# Patient Record
Sex: FEMALE | Employment: FULL TIME | URBAN - NONMETROPOLITAN AREA
[De-identification: names, ages, dates, MRNs, and addresses within clinical notes are randomized per-mention and may not be internally consistent; named-entity substitution may affect disease eponyms.]

---

## 2018-02-12 ENCOUNTER — HOSPITAL ENCOUNTER (OUTPATIENT)
Dept: MRI IMAGING | Facility: OTHER | Age: 28
Discharge: HOME | End: 2018-02-12
Attending: FAMILY MEDICINE | Admitting: FAMILY MEDICINE
Payer: COMMERCIAL

## 2018-02-12 VITALS — BODY MASS INDEX: 20.67 KG/M2 | WEIGHT: 130 LBS

## 2018-02-12 DIAGNOSIS — H93.19 TINNITUS: ICD-10-CM

## 2018-02-12 DIAGNOSIS — H90.5 SENSORINEURAL HEARING LOSS: ICD-10-CM

## 2018-02-12 DIAGNOSIS — R20.2 PARESTHESIA OF LEFT UPPER AND LOWER EXTREMITY: ICD-10-CM

## 2018-02-12 DIAGNOSIS — R20.0: ICD-10-CM

## 2018-02-12 PROCEDURE — 25500064 ZZH RX 255 OP 636: Performed by: RADIOLOGY

## 2018-02-12 PROCEDURE — 70553 MRI BRAIN STEM W/O & W/DYE: CPT

## 2018-02-12 PROCEDURE — A9575 INJ GADOTERATE MEGLUMI 0.1ML: HCPCS | Performed by: RADIOLOGY

## 2018-02-12 RX ORDER — GADOTERATE MEGLUMINE 376.9 MG/ML
11 INJECTION INTRAVENOUS ONCE
Status: DISCONTINUED | OUTPATIENT
Start: 2018-02-12 | End: 2018-02-12 | Stop reason: CLARIF

## 2018-02-12 RX ORDER — GADOTERATE MEGLUMINE 376.9 MG/ML
15 INJECTION INTRAVENOUS ONCE
Status: DISCONTINUED | OUTPATIENT
Start: 2018-02-12 | End: 2018-02-12 | Stop reason: CLARIF

## 2018-02-12 RX ORDER — GADOTERATE MEGLUMINE 376.9 MG/ML
0.2 INJECTION INTRAVENOUS ONCE
Status: COMPLETED | OUTPATIENT
Start: 2018-02-12 | End: 2018-02-12

## 2018-02-12 RX ADMIN — GADOTERATE MEGLUMINE 12 ML: 376.9 INJECTION INTRAVENOUS at 16:30

## 2018-03-07 ENCOUNTER — DOCUMENTATION ONLY (OUTPATIENT)
Dept: FAMILY MEDICINE | Facility: OTHER | Age: 28
End: 2018-03-07

## 2019-05-21 ENCOUNTER — TRANSFERRED RECORDS (OUTPATIENT)
Dept: HEALTH INFORMATION MANAGEMENT | Facility: CLINIC | Age: 29
End: 2019-05-21

## 2019-05-23 NOTE — TELEPHONE ENCOUNTER
RECORDS RECEIVED FROM: Rt knee injury, appt per pt. Records at [facility]. No previous surgery, Imaging at [facility]   DATE RECEIVED: Blair 3, 2019    NOTES STATUS DETAILS   OFFICE NOTE from referring provider Received [facility] 5/21/19   OFFICE NOTE from other specialist N/A    DISCHARGE SUMMARY from hospital N/A    DISCHARGE REPORT from the ER N/A    OPERATIVE REPORT N/A    MEDICATION LIST Received    IMPLANT RECORD/STICKER N/A    LABS     CBC/DIFF N/A    CULTURES N/A    INJECTIONS DONE IN RADIOLOGY N/A    MRI Received 5/28/19   CT SCAN N/A    XRAYS (IMAGES & REPORTS) received 5/21/19   TUMOR     PATHOLOGY  Slides & report N/A      05/23/19   5:00 PM  Faxed request to [facility]    05/28/19   11:55 AM  [facility] refuses to send records. Requires sina.  4:12 PM attempted to reach pt for SINA    05/30/19   3:35 PM  After speaking with patient, emailed her an SINA to sign    06/03/19   9:00 AM  Received SINA from patient, faxed request and sina to [facility].

## 2019-05-28 ENCOUNTER — TRANSFERRED RECORDS (OUTPATIENT)
Dept: HEALTH INFORMATION MANAGEMENT | Facility: CLINIC | Age: 29
End: 2019-05-28

## 2019-06-03 ENCOUNTER — OFFICE VISIT (OUTPATIENT)
Dept: ORTHOPEDICS | Facility: CLINIC | Age: 29
End: 2019-06-03
Payer: COMMERCIAL

## 2019-06-03 ENCOUNTER — PRE VISIT (OUTPATIENT)
Dept: ORTHOPEDICS | Facility: CLINIC | Age: 29
End: 2019-06-03

## 2019-06-03 VITALS — WEIGHT: 135 LBS | BODY MASS INDEX: 21.69 KG/M2 | HEIGHT: 66 IN

## 2019-06-03 DIAGNOSIS — G89.29 CHRONIC PAIN OF RIGHT KNEE: Primary | ICD-10-CM

## 2019-06-03 DIAGNOSIS — M25.561 CHRONIC PAIN OF RIGHT KNEE: Primary | ICD-10-CM

## 2019-06-03 ASSESSMENT — ENCOUNTER SYMPTOMS
BLOATING: 0
SINUS CONGESTION: 0
VOMITING: 0
HOARSE VOICE: 0
TASTE DISTURBANCE: 0
SORE THROAT: 0
NAUSEA: 1
RECTAL PAIN: 0
DIARRHEA: 0
HEARTBURN: 0
BLOOD IN STOOL: 0
ABDOMINAL PAIN: 0
BOWEL INCONTINENCE: 0
NECK MASS: 0
CONSTIPATION: 0
SINUS PAIN: 0
SMELL DISTURBANCE: 0
TROUBLE SWALLOWING: 0
JAUNDICE: 0

## 2019-06-03 ASSESSMENT — MIFFLIN-ST. JEOR: SCORE: 1369.11

## 2019-06-03 NOTE — LETTER
6/3/2019       RE: Erika Christianson  [address] [place] [address] [address]  [place] [address]     Dear Colleague,    Thank you for referring your patient, Erika Christianson, to [facility]. Please see a copy of my visit note below.    CHIEF CONCERN: Right ACL tear    HISTORY:   28-year-old female was picking up a Frisbee.  Felt a pop.  Unable to continue to play.  Saw outside provider who obtain an MRI.  Showed grade 3 rupture of her anterior cruciate ligament.  Referred to my clinic for definitive management.    PAST MEDICAL HISTORY: (Reviewed with the patient and in the [facility] medical record)  1. None    PAST SURGICAL HISTORY: (Reviewed with the patient and in the [facility] medical record)  1. None    MEDICATIONS: (Reviewed with the patient and in the [facility] medical record)    Notable medications include: None    ALLERGIES: (Reviewed with the patient and in the [facility] medical record)  1. Per epic it does include doxycycline sulfa and amoxicillin clavulanate      SOCIAL HISTORY: (Reviewed with the patient and in the medical record)  --Tobacco: None  --Occupation: She works as a nurse at the [employer]  --Avocation/Sport: Enjoys reading books as well as playing [employer], she admits she does not really play sports    FAMILY HISTORY: (Reviewed with the patient and in the medical record)  -- No family history of bleeding, clotting, or difficulty with anesthesia        REVIEW OF SYSTEMS: (Reviewed with the patient and on the health intake form)  -- A comprehensive 10 point review of systems was conducted and is negative except as noted in the HPI    EXAM:     General: Awake, Alert and Oriented, No acute Distress. Articulate and Interactive    Body mass index is 21.79 kg/m .    Right lower extremity :    Skin is Warm and Well perfused, no suggestion of infection    No incisions    1+ effusion    Positive knee tenderness no definite medial lateral joint line tenderness    Range of motion  is 0 to 110 degrees    2B Lachman, 1+ pivot shift, stable to varus valgus stress testing stable posterior drawer testing    EHL/FHL/TA/GS 5/5    Sensation intact L3-S1    2+ Dorsalis Pedis Pulse         IMAGING:    Plain Radiographs: Plain radiographs show no fractures or dislocations    MRI: Weight 3 rupture of the ACL, intact menisci    ASSESSMENT:  1. ACL tear right knee, intact menisci    PLAN:  1. I offered ACL reconstruction, bone tendon bone autograft    I discussed with her the risks benefits comp occasions techniques and alternatives to surgery we reviewed the expected course of recovery and alternative treatment options together through a combined decision making approach we elected to proceed.    I will look for her at the time of surgery.  Sooner if there are problems along the way.  She will get a preoperative history and physical.  I recognize that she has a history of Lyme's disease and that she may have a flare from the symptoms however I told her that the reason not to do surgery.      Again, thank you for allowing me to participate in the care of your patient.      Sincerely,    Arias Sanchez MD

## 2019-06-03 NOTE — PROGRESS NOTES
CHIEF CONCERN: Right ACL tear    HISTORY:   28-year-old female was picking up a Frisbee.  Felt a pop.  Unable to continue to play.  Saw outside provider who obtain an MRI.  Showed grade 3 rupture of her anterior cruciate ligament.  Referred to my clinic for definitive management.    PAST MEDICAL HISTORY: (Reviewed with the patient and in the [facility] medical record)  1. None    PAST SURGICAL HISTORY: (Reviewed with the patient and in the [facility] medical record)  1. None    MEDICATIONS: (Reviewed with the patient and in the [facility] medical record)    Notable medications include: None    ALLERGIES: (Reviewed with the patient and in the [facility] medical record)  1. Per epic it does include doxycycline sulfa and amoxicillin clavulanate      SOCIAL HISTORY: (Reviewed with the patient and in the medical record)  --Tobacco: None  --Occupation: She works as a nurse at the [employer]  --Avocation/Sport: Enjoys reading books as well as playing Frisbee, she admits she does not really play sports    FAMILY HISTORY: (Reviewed with the patient and in the medical record)  -- No family history of bleeding, clotting, or difficulty with anesthesia        REVIEW OF SYSTEMS: (Reviewed with the patient and on the health intake form)  -- A comprehensive 10 point review of systems was conducted and is negative except as noted in the HPI    EXAM:     General: Awake, Alert and Oriented, No acute Distress. Articulate and Interactive    Body mass index is 21.79 kg/m .    Right lower extremity :    Skin is Warm and Well perfused, no suggestion of infection    No incisions    1+ effusion    Positive knee tenderness no definite medial lateral joint line tenderness    Range of motion is 0 to 110 degrees    2B Lachman, 1+ pivot shift, stable to varus valgus stress testing stable posterior drawer testing    EHL/FHL/TA/GS 5/5    Sensation intact L3-S1    2+ Dorsalis Pedis Pulse         IMAGING:    Plain Radiographs: Plain radiographs show no fractures or  dislocations    MRI: Weight 3 rupture of the ACL, intact menisci    ASSESSMENT:  1. ACL tear right knee, intact menisci    PLAN:  1. I offered ACL reconstruction, bone tendon bone autograft    I discussed with her the risks benefits comp occasions techniques and alternatives to surgery we reviewed the expected course of recovery and alternative treatment options together through a combined decision making approach we elected to proceed.    I will look for her at the time of surgery.  Sooner if there are problems along the way.  She will get a preoperative history and physical.  I recognize that she has a history of Lyme's disease and that she may have a flare from the symptoms however I told her that the reason not to do surgery.

## 2019-06-04 ENCOUNTER — DOCUMENTATION ONLY (OUTPATIENT)
Dept: ORTHOPEDICS | Facility: CLINIC | Age: 29
End: 2019-06-04

## 2019-06-04 NOTE — PROGRESS NOTES
Patient is scheduled for surgery with Dr. Sanchez    Spoke or left message with: Patient in exam room    Date of Surgery: 6/28/19    [facility]    Post op: 1 week, scheduled     Pre-op with surgeon (if applicable): Complete    H&P: Patient to schedule    Additional imaging/appointments: N/A    Surgery packet: Received in clinic     Additional comments: N/A

## 2019-06-04 NOTE — NURSING NOTE
Teaching Flowsheet   Relevant Diagnosis: Right ACL tear  Teaching Topic: Right ACL reconstruction.    Patient's health history is negative on review. 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     Person(s) involved in teaching:   Patient     Motivation Level:  Asks Questions: Yes  Eager to Learn: Yes  Cooperative: Yes  Receptive (willing/able to accept information): Yes  Any cultural factors/Rastafarian beliefs that may influence understanding or compliance? No     Patient demonstrates understanding of the following:  Reason for the appointment, diagnosis and treatment plan: Yes  Knowledge of proper use of medications and conditions for which they are ordered (with special attention to potential side effects or drug interactions): Yes  Which situations necessitate calling provider and whom to contact: Yes     Teaching Concerns Addressed: Patient understands she will need a preoperative H&P within 30 days of the date of surgery. PT should begin 3-5 days postop.     Proper use and care of brace, crutches (medical equip, care aids, etc.): Yes  Nutritional needs and diet plan: Yes  Pain management techniques: Yes  Wound Care: Yes  How and/when to access community resources: Yes     Instructional Materials Used/Given: Preoperative teaching packet, surgical soap.

## 2019-06-27 ENCOUNTER — ANESTHESIA EVENT (OUTPATIENT)
Dept: SURGERY | Facility: AMBULATORY SURGERY CENTER | Age: 29
End: 2019-06-27

## 2019-06-28 ENCOUNTER — HOSPITAL ENCOUNTER (OUTPATIENT)
Facility: AMBULATORY SURGERY CENTER | Age: 29
End: 2019-06-28
Attending: ORTHOPAEDIC SURGERY
Payer: COMMERCIAL

## 2019-06-28 ENCOUNTER — ANESTHESIA (OUTPATIENT)
Dept: SURGERY | Facility: AMBULATORY SURGERY CENTER | Age: 29
End: 2019-06-28

## 2019-06-28 VITALS
SYSTOLIC BLOOD PRESSURE: 108 MMHG | WEIGHT: 135 LBS | HEART RATE: 53 BPM | DIASTOLIC BLOOD PRESSURE: 81 MMHG | OXYGEN SATURATION: 100 % | BODY MASS INDEX: 21.69 KG/M2 | RESPIRATION RATE: 16 BRPM | HEIGHT: 66 IN | TEMPERATURE: 98 F

## 2019-06-28 DIAGNOSIS — Z98.890 STATUS POST KNEE SURGERY: Primary | ICD-10-CM

## 2019-06-28 LAB
HCG UR QL: NEGATIVE
INTERNAL QC OK POCT: YES

## 2019-06-28 DEVICE — IMP SCR ARTHREX CAN FULL THRD 08X25MM AR-1381T: Type: IMPLANTABLE DEVICE | Site: KNEE | Status: FUNCTIONAL

## 2019-06-28 DEVICE — IMP SCR ARTHREX CAN 07X20MM AR-1370E: Type: IMPLANTABLE DEVICE | Site: KNEE | Status: FUNCTIONAL

## 2019-06-28 RX ORDER — KETOROLAC TROMETHAMINE 30 MG/ML
30 INJECTION, SOLUTION INTRAMUSCULAR; INTRAVENOUS
Status: COMPLETED | OUTPATIENT
Start: 2019-06-28 | End: 2019-06-28

## 2019-06-28 RX ORDER — BUPIVACAINE HYDROCHLORIDE AND EPINEPHRINE 2.5; 5 MG/ML; UG/ML
INJECTION, SOLUTION INFILTRATION; PERINEURAL PRN
Status: DISCONTINUED | OUTPATIENT
Start: 2019-06-28 | End: 2019-06-28

## 2019-06-28 RX ORDER — DEXAMETHASONE SODIUM PHOSPHATE 4 MG/ML
INJECTION, SOLUTION INTRA-ARTICULAR; INTRALESIONAL; INTRAMUSCULAR; INTRAVENOUS; SOFT TISSUE PRN
Status: DISCONTINUED | OUTPATIENT
Start: 2019-06-28 | End: 2019-06-28

## 2019-06-28 RX ORDER — ONDANSETRON 4 MG/1
4 TABLET, ORALLY DISINTEGRATING ORAL EVERY 30 MIN PRN
Status: DISCONTINUED | OUTPATIENT
Start: 2019-06-28 | End: 2019-06-29 | Stop reason: HOSPADM

## 2019-06-28 RX ORDER — BUPIVACAINE HYDROCHLORIDE AND EPINEPHRINE 2.5; 5 MG/ML; UG/ML
INJECTION, SOLUTION INFILTRATION; PERINEURAL PRN
Status: DISCONTINUED | OUTPATIENT
Start: 2019-06-28 | End: 2019-06-28 | Stop reason: HOSPADM

## 2019-06-28 RX ORDER — ONDANSETRON 4 MG/1
4-8 TABLET, ORALLY DISINTEGRATING ORAL EVERY 8 HOURS PRN
Qty: 4 TABLET | Refills: 0 | Status: SHIPPED | OUTPATIENT
Start: 2019-06-28

## 2019-06-28 RX ORDER — PROPOFOL 10 MG/ML
INJECTION, EMULSION INTRAVENOUS PRN
Status: DISCONTINUED | OUTPATIENT
Start: 2019-06-28 | End: 2019-06-28

## 2019-06-28 RX ORDER — LIDOCAINE HYDROCHLORIDE 20 MG/ML
INJECTION, SOLUTION INFILTRATION; PERINEURAL PRN
Status: DISCONTINUED | OUTPATIENT
Start: 2019-06-28 | End: 2019-06-28

## 2019-06-28 RX ORDER — PROPOFOL 10 MG/ML
INJECTION, EMULSION INTRAVENOUS CONTINUOUS PRN
Status: DISCONTINUED | OUTPATIENT
Start: 2019-06-28 | End: 2019-06-28

## 2019-06-28 RX ORDER — OXYCODONE HYDROCHLORIDE 5 MG/1
5 TABLET ORAL EVERY 4 HOURS PRN
Status: DISCONTINUED | OUTPATIENT
Start: 2019-06-28 | End: 2019-06-29 | Stop reason: HOSPADM

## 2019-06-28 RX ORDER — SODIUM CHLORIDE, SODIUM LACTATE, POTASSIUM CHLORIDE, CALCIUM CHLORIDE 600; 310; 30; 20 MG/100ML; MG/100ML; MG/100ML; MG/100ML
INJECTION, SOLUTION INTRAVENOUS CONTINUOUS
Status: DISCONTINUED | OUTPATIENT
Start: 2019-06-28 | End: 2019-06-28 | Stop reason: HOSPADM

## 2019-06-28 RX ORDER — AMOXICILLIN 250 MG
1-2 CAPSULE ORAL 2 TIMES DAILY
Qty: 24 TABLET | Refills: 0 | Status: SHIPPED | OUTPATIENT
Start: 2019-06-28

## 2019-06-28 RX ORDER — FLUMAZENIL 0.1 MG/ML
0.2 INJECTION, SOLUTION INTRAVENOUS
Status: DISCONTINUED | OUTPATIENT
Start: 2019-06-28 | End: 2019-06-28 | Stop reason: HOSPADM

## 2019-06-28 RX ORDER — GABAPENTIN 300 MG/1
300 CAPSULE ORAL ONCE
Status: COMPLETED | OUTPATIENT
Start: 2019-06-28 | End: 2019-06-28

## 2019-06-28 RX ORDER — HYDROMORPHONE HYDROCHLORIDE 1 MG/ML
.3-.5 INJECTION, SOLUTION INTRAMUSCULAR; INTRAVENOUS; SUBCUTANEOUS EVERY 10 MIN PRN
Status: DISCONTINUED | OUTPATIENT
Start: 2019-06-28 | End: 2019-06-29 | Stop reason: HOSPADM

## 2019-06-28 RX ORDER — OXYCODONE HYDROCHLORIDE 5 MG/1
5-10 TABLET ORAL EVERY 4 HOURS PRN
Qty: 30 TABLET | Refills: 0 | Status: SHIPPED | OUTPATIENT
Start: 2019-06-28

## 2019-06-28 RX ORDER — FENTANYL CITRATE 50 UG/ML
25-50 INJECTION, SOLUTION INTRAMUSCULAR; INTRAVENOUS EVERY 5 MIN PRN
Status: DISCONTINUED | OUTPATIENT
Start: 2019-06-28 | End: 2019-06-28 | Stop reason: HOSPADM

## 2019-06-28 RX ORDER — NALOXONE HYDROCHLORIDE 0.4 MG/ML
.1-.4 INJECTION, SOLUTION INTRAMUSCULAR; INTRAVENOUS; SUBCUTANEOUS
Status: DISCONTINUED | OUTPATIENT
Start: 2019-06-28 | End: 2019-06-28 | Stop reason: HOSPADM

## 2019-06-28 RX ORDER — SODIUM CHLORIDE, SODIUM LACTATE, POTASSIUM CHLORIDE, CALCIUM CHLORIDE 600; 310; 30; 20 MG/100ML; MG/100ML; MG/100ML; MG/100ML
INJECTION, SOLUTION INTRAVENOUS CONTINUOUS
Status: DISCONTINUED | OUTPATIENT
Start: 2019-06-28 | End: 2019-06-29 | Stop reason: HOSPADM

## 2019-06-28 RX ORDER — FENTANYL CITRATE 50 UG/ML
25-50 INJECTION, SOLUTION INTRAMUSCULAR; INTRAVENOUS
Status: DISCONTINUED | OUTPATIENT
Start: 2019-06-28 | End: 2019-06-28 | Stop reason: HOSPADM

## 2019-06-28 RX ORDER — ONDANSETRON 2 MG/ML
4 INJECTION INTRAMUSCULAR; INTRAVENOUS EVERY 30 MIN PRN
Status: DISCONTINUED | OUTPATIENT
Start: 2019-06-28 | End: 2019-06-29 | Stop reason: HOSPADM

## 2019-06-28 RX ORDER — ACETAMINOPHEN 325 MG/1
650 TABLET ORAL EVERY 4 HOURS
Qty: 120 TABLET | Refills: 0 | Status: SHIPPED | OUTPATIENT
Start: 2019-06-28

## 2019-06-28 RX ORDER — CLINDAMYCIN PHOSPHATE 900 MG/50ML
900 INJECTION, SOLUTION INTRAVENOUS
Status: DISCONTINUED | OUTPATIENT
Start: 2019-06-28 | End: 2019-06-28 | Stop reason: HOSPADM

## 2019-06-28 RX ORDER — ACETAMINOPHEN 325 MG/1
975 TABLET ORAL ONCE
Status: COMPLETED | OUTPATIENT
Start: 2019-06-28 | End: 2019-06-28

## 2019-06-28 RX ORDER — NALOXONE HYDROCHLORIDE 0.4 MG/ML
.1-.4 INJECTION, SOLUTION INTRAMUSCULAR; INTRAVENOUS; SUBCUTANEOUS
Status: DISCONTINUED | OUTPATIENT
Start: 2019-06-28 | End: 2019-06-29 | Stop reason: HOSPADM

## 2019-06-28 RX ORDER — CLINDAMYCIN PHOSPHATE 900 MG/50ML
900 INJECTION, SOLUTION INTRAVENOUS SEE ADMIN INSTRUCTIONS
Status: DISCONTINUED | OUTPATIENT
Start: 2019-06-28 | End: 2019-06-28 | Stop reason: HOSPADM

## 2019-06-28 RX ADMIN — FENTANYL CITRATE 50 MCG: 50 INJECTION, SOLUTION INTRAMUSCULAR; INTRAVENOUS at 10:38

## 2019-06-28 RX ADMIN — ACETAMINOPHEN 975 MG: 325 TABLET ORAL at 09:36

## 2019-06-28 RX ADMIN — CLINDAMYCIN PHOSPHATE 900 MG: 900 INJECTION, SOLUTION INTRAVENOUS at 10:36

## 2019-06-28 RX ADMIN — KETOROLAC TROMETHAMINE 30 MG: 30 INJECTION, SOLUTION INTRAMUSCULAR; INTRAVENOUS at 12:46

## 2019-06-28 RX ADMIN — LIDOCAINE HYDROCHLORIDE 60 MG: 20 INJECTION, SOLUTION INFILTRATION; PERINEURAL at 10:28

## 2019-06-28 RX ADMIN — HYDROMORPHONE HYDROCHLORIDE 0.3 MG: 1 INJECTION, SOLUTION INTRAMUSCULAR; INTRAVENOUS; SUBCUTANEOUS at 13:01

## 2019-06-28 RX ADMIN — HYDROMORPHONE HYDROCHLORIDE 0.3 MG: 1 INJECTION, SOLUTION INTRAMUSCULAR; INTRAVENOUS; SUBCUTANEOUS at 12:50

## 2019-06-28 RX ADMIN — GABAPENTIN 300 MG: 300 CAPSULE ORAL at 09:36

## 2019-06-28 RX ADMIN — SODIUM CHLORIDE, SODIUM LACTATE, POTASSIUM CHLORIDE, CALCIUM CHLORIDE: 600; 310; 30; 20 INJECTION, SOLUTION INTRAVENOUS at 09:53

## 2019-06-28 RX ADMIN — FENTANYL CITRATE 50 MCG: 50 INJECTION, SOLUTION INTRAMUSCULAR; INTRAVENOUS at 12:47

## 2019-06-28 RX ADMIN — BUPIVACAINE HYDROCHLORIDE AND EPINEPHRINE 10 ML: 2.5; 5 INJECTION, SOLUTION INFILTRATION; PERINEURAL at 10:10

## 2019-06-28 RX ADMIN — FENTANYL CITRATE 50 MCG: 50 INJECTION, SOLUTION INTRAMUSCULAR; INTRAVENOUS at 12:37

## 2019-06-28 RX ADMIN — FENTANYL CITRATE 50 MCG: 50 INJECTION, SOLUTION INTRAMUSCULAR; INTRAVENOUS at 10:13

## 2019-06-28 RX ADMIN — PROPOFOL 200 MG: 10 INJECTION, EMULSION INTRAVENOUS at 10:28

## 2019-06-28 RX ADMIN — DEXAMETHASONE SODIUM PHOSPHATE 4 MG: 4 INJECTION, SOLUTION INTRA-ARTICULAR; INTRALESIONAL; INTRAMUSCULAR; INTRAVENOUS; SOFT TISSUE at 10:36

## 2019-06-28 RX ADMIN — PROPOFOL 200 MCG/KG/MIN: 10 INJECTION, EMULSION INTRAVENOUS at 10:28

## 2019-06-28 RX ADMIN — OXYCODONE HYDROCHLORIDE 5 MG: 5 TABLET ORAL at 12:38

## 2019-06-28 RX ADMIN — FENTANYL CITRATE 50 MCG: 50 INJECTION, SOLUTION INTRAMUSCULAR; INTRAVENOUS at 12:04

## 2019-06-28 RX ADMIN — PROPOFOL: 10 INJECTION, EMULSION INTRAVENOUS at 11:25

## 2019-06-28 RX ADMIN — ONDANSETRON 4 MG: 2 INJECTION INTRAMUSCULAR; INTRAVENOUS at 12:05

## 2019-06-28 ASSESSMENT — MIFFLIN-ST. JEOR: SCORE: 1368.86

## 2019-06-28 ASSESSMENT — LIFESTYLE VARIABLES: TOBACCO_USE: 0

## 2019-06-28 NOTE — OP NOTE
PREOPERATIVE DIAGNOSIS:   1. ACL tear right knee    POSTOPERATIVE DIAGNOSIS:  1. ACL tear right knee  2. Intact medial lateral meniscus    PROCEDURE:  1. Examination under anesthesia right knee  2. Right knee arthroscopy  3. ACL reconstruction bone tendon bone autograft    DATE OF SURGERY: 6/28/2019    SURGEON: Arias Sanchez MD    ASSISTANT: None.     RESIDENT OR FELLOW: Caryl Mccallum MD; Jayson Lin MD    OPERATIVE INDICATIONS: Erika Christianson is a pleasant 28 year old female who I saw through my orthopedic clinic with a history, physical, imaging consistent with right ACL tear.  I reviewed with the patient the risks, benefits, complications, techniques and alternatives to surgery.  We reviewed the expected course of recovery and the potential expected outcomes.  The patient understood both the risks and benefits and desired to proceed despite the risks.    OPERATIVE DETAILS: In the preoperative area the patient's informed consent was reviewed and they desired to proceed.  The right leg was marked and the patient was in agreement.  The patient was taken to the operating room where a timeout was performed and all parties were in agreement.  Preoperative antibiotics were given within 1 hour of the time of incision.  The patient was placed in the supine position and surrendered to LMA anesthesia.  No tourniquet was applied.  Egg crate was placed beneath the well leg and a side post was utilized.  The operative leg was prepped and draped in the usual sterile fashion.     Examination under anesthesia: Range of motion 0 to 145 degrees, 1 quadrant medial and 2 quadrant lateral translation of the patella, stable to varus and valgus stress testing, stable posterior drawer testing, 2+ anterior drawer testing, 2B Lachman, 1+ pivot shift    Graft harvest and preparation: A 5 cm midline incision was made and hemostasis was insured with the Bovie electrocautery.  The peritenon was opened longitudinally.  And we selected a  "section of tendon 10 mm in width.  We then marked on the tibial side 10 x 25 mm.  This was marked with a knife, defined with a Bovie and cut with an oscillating saw it was delivered into the wound with an osteotome.  The knee was brought to full extension where a proximal patellar retractor was placed.  We selected a section of bone 10 mm in width by 20 mm in length it was marked with a knife to find with the Bovie and cut with an oscillating saw.  No malleting was performed of the patella.    The graft was taken to the back table where it was fashioned to a 10 on the femur size 10 on the tibia.  2 drill holes were placed in each of the bone blocks and #2 fiber wires were placed through these holes.  A \"safety stitch\" was placed at the bone tendon interface on the tibial side.  Ink marking pen was used to eric the bone tendon interface in the femoral side.  The final graft dimensions showed a 20 mm bone block on the femoral side, a 25 mm bone block on the tibial side and the tibial plus tendon length of 75 mm.    Anterior medial and anterior lateral arthroscopic portals were created and a diagnostic arthroscopy was performed with the following findings: The medial patella facet, lateral patella facet, central ridge of the patella showed normal cartilage.  The trochlear cartilage was normal.  The medial femoral condyle showed normal cartilage and medial tibial plateau showed normal cartilage. The lateral femoral condyle showed mild cartilage and lateral tibial plateau showed normal. The Medial meniscus intact and Lateral Meniscus intact.  There was a grade 3 rupture of the anterior cruciate ligament with positive empty wall sign.  The PCL was intact.  There was no opening to varus and valgus stress testing in either the lateral or medial compartments, respectively.    A debridement of the nonfunctioning ACL was then performed until we could visualize the anatomic insertion sites of the ACL on both the femoral and the " tibial origin.  Remnant preservation was pursued in the tibial side and the tibial tunnel was centered midway between the medial and lateral tibial spines in line with the posterior aspect of the anterior horn of the lateral meniscus.    A tip to tip guide was introduced through the medial portal.  Our osseous length measured 45 mm to accommodate the tibial plus tendon length of our graft.  A 2.4 mm drill to pin was placed into the center of the anatomic insertion of the ACL on the tibial side.  A 10 mm reamer was then placed over this guidepin.  We dilated sequentially from 10-10.5 mm.  A plug was placed on the tibial side.    A spinal needle was then used to localize our accessory medial portal.  Once we are satisfied with its position a Sivakumar awl was used to select our location along the anatomic insertion of the ACL on the femoral footprint.  A Beath pin was placed.  The knee was hyperflexed.  The pin was advanced through the femur and a 10 mm low-profile reamer was used to ream a 25 mm femoral socket.  Bone debris was removed with motorized suction.  A passing suture was placed which was routed through the tibia.  Notching of the femoral tunnel was then performed.    The graft was brought up the patellar donor bone block was reduced through the tibial tunnel and dunked into the femur where it was fixed with a 7 x 20 mm metal interference screw.  Excellent purchase of the screw is noted.  The graft was cycled 20 times, maximal manual traction was applied and an 8 x 25 mm screw was placed in the tibial side with excellent purchase.  Lachman 0, no pivot shift, final arthroscopic images showed clearance along the root of the intercondylar notch and extension, clearance along the lateral wall and PCL in flexion.  Good tension to probing.    Copious irrigation was performed an a layered closure was initiated, sterile dressings were applied and the patient was transferred to the recovery room in stable condition  with stable vital signs.    ESTIMATED BLOOD LOSS: 25 mL.    TOURNIQUET TIME: No tourniquet was placed.    COMPLICATIONS: None apparent.    DRAINS: None.    SPECIMENS: None.     POSTOPERATIVE PLAN:  Weightbearing as tolerated, wean from crutches when able  Knee immobilizer times 1 week then wean when able  Average time to wean from crutches and brace is 2-3 weeks  The goal is to walk into my clinic at 6 weeks with no brace and no crutches  No running until 3 months  No sports until 6 months, return to game competition at 7-10 months  Shower on day 3  Start physical therapy day 3-5

## 2019-06-28 NOTE — ANESTHESIA POSTPROCEDURE EVALUATION
Anesthesia POST Procedure Evaluation    Patient: Erika Christianson   MRN:     [id number] Gender:   female   Age:    28 year old :      1990        Preoperative Diagnosis: Anterior Cruciate Ligament Tear   Procedure(s):  Right Examinaton Under Anesthesia. Anterior Cruciate Ligament Resconstruction Bone Tendon Bone Autograft   Postop Comments: No value filed.       Anesthesia Type:  General, Regional  No value filed.    Reportable Event: NO     PAIN: Uncomplicated   Sign Out status: Comfortable, Well controlled pain     PONV: No PONV   Sign Out status:  No Nausea or Vomiting     Neuro/Psych: Uneventful perioperative course   Sign Out Status: Preoperative baseline; Age appropriate mentation     Airway/Resp.: Uneventful perioperative course   Sign Out Status: Non labored breathing, age appropriate RR; Resp. Status within EXPECTED Parameters     CV: Uneventful perioperative course   Sign Out status: Appropriate BP and perfusion indices; Appropriate HR/Rhythm     Disposition:   Sign Out in:  PACU  Disposition:  Phase II; Home  Recovery Course: Uneventful  Follow-Up: Not required           Last Anesthesia Record Vitals:  CRNA VITALS  2019 1151 - 2019 1251      2019             Pulse:  76    SpO2:  99 %          Last PACU Vitals:  Vitals Value Taken Time   /77 2019  1:00 PM   Temp 36.7  C (98  F) 2019  1:00 PM   Pulse 78 2019  1:00 PM   Resp 23 2019  1:12 PM   SpO2 100 % 2019  1:12 PM   Temp src     NIBP     Pulse     SpO2     Resp     Temp     Ht Rate     Temp 2     Vitals shown include unvalidated device data.      Electronically Signed By: Froylan Orta MD, 2019, 1:40 PM

## 2019-06-28 NOTE — DISCHARGE INSTRUCTIONS
[facility]  Home Care Following Anesthesia  For 24 hours after surgery:  1. Get plenty of rest.  A responsible adult must stay with you for at least 24 hours after you leave the surgery center.  2. Do not drive or use heavy equipment.  If you have weakness or tingling, don't drive or use heavy equipment until this feeling goes away.   3. Do not drink alcohol.   4. Avoid strenuous or risky activities.  Ask for help when climbing stairs.  5. You may feel lightheaded.  IF so, sit for a few minutes before standing.  Have someone help you get up.   6. If you have nausea (feel sick to your stomach): Drink only clear liquids such as apple juice, ginger ale, broth or 7-Up.  Rest may also help.  Be sure to drink enough fluids.  Move to a regular diet as you feel able.   7. You may have a slight fever.  Call the doctor if your fever is over 100 F (37.7 C) (taken under the tongue) or lasts longer than 24 hours.  8. You may have a dry mouth, a sore throat, muscle aches or trouble sleeping. These should go away after 24 hours.  9. Do not make important or legal decisions.            Tips for taking pain medications  To get the best pain relief possible, remember these points:    Take pain medications as directed, before pain becomes severe.    Pain medication can upset your stomach: taking it with food may help.    Constipation is a common side effect of pain medication. Drink plenty of  fluids.    Eat foods high in fiber. Take a stool softener if recommended by your doctor or pharmacist.    Do not drink alcohol, drive or operate machinery while taking pain medications.    Ask about other ways to control pain, such as with heat, ice or relaxation.    Tylenol/Acetaminophen Consumption  To help encourage the safe use of acetaminophen, the makers of TYLENOL  have lowered the maximum daily dose for single-ingredient Extra Strength TYLENOL  (acetaminophen) products sold in the [place] from 8 pills  per day (4,000 mg) to 6 pills per day (3,000 mg). The dosing interval has also changed from 2 pills every 4-6 hours to 2 pills every 6 hours.    If you feel your pain relief is insufficient, you may take Tylenol/Acetaminophen in addition to your narcotic pain medication.     Be careful not to exceed 3,000 mg of Tylenol/Acetaminophen in a 24 hour period from all sources.    If you are taking extra strength Tylenol/acetaminophen (500 mg), the maximum dose is 6 tablets in 24 hours.    If you are taking regular strength acetaminophen (325 mg), the maximum dose is 9 tablets in 24 hours.    Call a doctor for any of the followin. Signs of infection (fever, growing tenderness at the surgery site, a large amount of drainage or bleeding, severe pain, foul-smelling drainage, redness, swelling).  2. It has been over 8 to 10 hours since surgery and you are still not able to urinate (pass water).  3. Headache for over 24 hours.  4. Numbness, tingling or weakness the day after surgery (if you had spinal anesthesia).  Your doctor is:       Dr. Arias Sanchez, Orthopaedics: [phone]               Or dial [phone] and ask for the resident on call for:  Orthopaedics  For emergency care, call the:  [facility] Emergency Department: [phone] (TTY for hearing impaired: [phone])Post Operative Instructions: Regional Anesthetic for Lower Extremity     General Information:   Regional anesthesia is when local anesthetic or  numbing  medication is injected around the nerves to anesthetize or  numb  the area supplied by that set of nerves. It is a type of analgesia used to control pain and decreases the need for narcotics following surgery.    Types of Regional Blocks:  Femoral: A block injected into the groin area of the operative leg of a patient having thigh or knee surgery.  Adductor Canal: A block injected into the mid thigh of the operative leg of a patient having knee or ankle surgery.  Popliteal or Distal Sciatic:  A block injected into the back of the knee of the operative leg of patients having foot or ankle surgery.   Ankle:  An anesthetic medication is injected into the ankle of the operative leg of a patient having foot or toe surgery.     Procedure:   The type of anesthesia your doctor used to numb your leg will usually not wear off for 6-18 hours, but may last as long as 24 hours. You should be careful during that period, since it is possible to injure your leg without being aware of the injury. While your leg is numb you should:    Use crutches (minimal weight bearing until your motor and strength is completely back to normal)    Avoid striking or bumping your leg    Avoid extreme hot or cold    Discomfort:  You will have a tingling and prickly sensation in your leg as the feeling begins to return; you can also expect some discomfort. The amount of discomfort is unpredictable, but if you have more pain than can be controlled with pain medication, you should notify your physician.     Pain Medicine:   Begin taking your oral pain pills (if you have not already done so) before bedtime and during the night to avoid a sudden onset of pain as the block wears off.  Do not engage in drinking, driving, or hazardous occupations while taking pain medication.

## 2019-06-28 NOTE — ANESTHESIA PREPROCEDURE EVALUATION
Anesthesia Pre-Procedure Evaluation    Patient: Erika Christianson   MRN:     [id number] Gender:   female   Age:    28 year old :      1990        Preoperative Diagnosis: Anterior Cruciate Ligament Tear   Procedure(s):  Right Examinaton Under Anesthesia. Anterior Cruciate Ligament Resconstruction Bone Tendon Bone Autograft  Right Meniscus Surgery.     No past medical history on file.   Past Surgical History:   Procedure Laterality Date     ORTHOPEDIC SURGERY      middle left toe surgery          Anesthesia Evaluation     . Pt has had prior anesthetic. Type: General    No history of anesthetic complications          ROS/MED HX    ENT/Pulmonary:      (-) tobacco use   Neurologic: Comment: Lyme disease      Cardiovascular:  - neg cardiovascular ROS       METS/Exercise Tolerance:     Hematologic:  - neg hematologic  ROS       Musculoskeletal: Comment: ACL        GI/Hepatic:  - neg GI/hepatic ROS       Renal/Genitourinary:  - ROS Renal section negative       Endo:  - neg endo ROS       Psychiatric:  - neg psychiatric ROS       Infectious Disease:  - neg infectious disease ROS       Malignancy:      - no malignancy   Other:                         PHYSICAL EXAM:   Mental Status/Neuro: A/A/O   Airway: Facies: Feasible  Mallampati: II  Mouth/Opening: Full  TM distance: > 6 cm  Neck ROM: Full   Respiratory: Auscultation: CTAB     Resp. Rate: Normal     Resp. Effort: Normal      CV: Rhythm: Regular  Rate: Age appropriate  Heart: Normal Sounds   Comments:      Dental: Normal                  Lab Results   Component Value Date    WBC 5.4 2012    HGB 13.2 2012    HCT 39.8 2012     2012     2012    POTASSIUM 3.8 2012    CHLORIDE 104 2012    CO2 28 2012    BUN 10 2012    CR 0.58 2012    GLC 74 2012    ISADORA 9.5 2012    TSH 2.05 2012    HCG Negative 2019    HCGS Negative 2012       Preop Vitals  BP Readings from Last 3  "Encounters:   06/28/19 120/84   11/09/12 114/77   05/03/12 109/66    Pulse Readings from Last 3 Encounters:   11/09/12 69   05/03/12 60   03/13/12 84      Resp Readings from Last 3 Encounters:   06/28/19 14   03/13/12 18    SpO2 Readings from Last 3 Encounters:   06/28/19 98%   11/09/12 99%   05/03/12 100%      Temp Readings from Last 1 Encounters:   06/28/19 36.9  C (98.5  F) (Temporal)    Ht Readings from Last 1 Encounters:   06/28/19 1.676 m (5' 5.98\")      Wt Readings from Last 1 Encounters:   06/28/19 61.2 kg (135 lb)    Estimated body mass index is 21.8 kg/m  as calculated from the following:    Height as of this encounter: 1.676 m (5' 5.98\").    Weight as of this encounter: 61.2 kg (135 lb).     LDA:  Peripheral IV 06/28/19 Left Hand (Active)   Site Assessment WDL 6/28/2019  9:55 AM   Line Status Infusing 6/28/2019  9:55 AM   Phlebitis Scale 0-->no symptoms 6/28/2019  9:55 AM   Dressing Intervention New dressing  6/28/2019  9:55 AM   Number of days: 0       Airway - Adult/Peds laryngeal mask airway (Active)   Number of days: 0       Airway - Adult/Peds 4 laryngeal mask airway (Active)   Number of days: 0            Assessment:   ASA SCORE: 1    NPO Status: > 6 hours since completed [employer]   Documentation: H&P complete; Preop Testing complete; Consents complete   Proceeding: Proceed without further delay  Tobacco Use:  NO Active use of Tobacco/UNKNOWN Tobacco use status     Plan:   Anes. Type:  General; Regional     RA Details:  Pre Induction; SS; Exparel     RA-Location/Type: Nerve Block; Adductor canal   Pre-Induction: Midazolam IV; Opioid IV; Acetaminophen PO   Induction:  IV (Standard)   Airway: LMA   Access/Monitoring: PIV   Maintenance: Balanced; Propofol   Emergence: Procedure Site   Logistics: Same Day Surgery     Postop Pain/Sedation Strategy:  Standard-Options: Opioids PRN; Block SS; Exparel     PONV Management:  Adult Risk Factors: Female, Non-Smoker, Postop Opioids  Prevention: Ondansetron; " Dexamethasone; Propofol Infusion     CONSENT: Direct conversation   Plan and risks discussed with: Patient   Blood Products: Consent Deferred (Minimal Blood Loss)                         Corbin Pang MD

## 2019-06-28 NOTE — ANESTHESIA PROCEDURE NOTES
Peripheral Nerve Block Procedure Note    Staff:     Anesthesiologist:  Froylan Orta MD    Resident/CRNA:  Corbin Pang MD    Block performed by resident/CRNA in the presence of a teaching physician    Location: Pre-op  Procedure Start/Stop TImes:      6/28/2019 10:05 AM     6/28/2019 10:15 AM    patient identified, IV checked, site marked, risks and benefits discussed, informed consent, monitors and equipment checked, pre-op evaluation, at physician/surgeon's request and post-op pain management      Correct Patient: Yes      Correct Position: Yes      Correct Site: Yes      Correct Procedure: Yes      Correct Laterality:  Yes    Site Marked:  Yes  Procedure details:     Procedure:  Adductor canal    ASA:  1    Laterality:  Right    Position:  Supine    Sterile Prep: chloraprep      Needle:  Short bevel and insulated    Needle gauge:  21    Needle length (mm):  110    Ultrasound: Yes      Ultrasound used to identify targeted nerve, plexus, or vascular structure and placed a needle adjacent to it      Permanent Image entered into patiient's record      Abnormal pain on injection: No      Blood Aspirated: No      Paresthesias:  No    Bleeding at site: No      Bolus via:  Needle    Infusion Method:  Single Shot    Complications:  None  Assessment/Narrative:     Injection made incrementally with aspirations every (mL):  5     Exparel 133mg

## 2019-06-28 NOTE — ANESTHESIA CARE TRANSFER NOTE
Patient: Erika Christianson    Procedure(s):  Right Examinaton Under Anesthesia. Anterior Cruciate Ligament Resconstruction Bone Tendon Bone Autograft    Diagnosis: Anterior Cruciate Ligament Tear  Diagnosis Additional Information: No value filed.    Anesthesia Type:   No value filed.     Note:  Airway :Room Air  Patient transferred to:PACU  Comments: VSS/WNL. Responds well.Handoff Report: Identifed the Patient, Identified the Reponsible Provider, Reviewed the pertinent medical history, Discussed the surgical course, Reviewed Intra-OP anesthesia mangement and issues during anesthesia, Set expectations for post-procedure period and Allowed opportunity for questions and acknowledgement of understanding      Vitals: (Last set prior to Anesthesia Care Transfer)    CRNA VITALS  6/28/2019 1151 - 6/28/2019 1227      6/28/2019             Pulse:  76    SpO2:  99 %                Electronically Signed By: LANA Pettit CRNA  June 28, 2019  12:27 PM

## 2019-07-02 DIAGNOSIS — Z98.890 S/P ACL RECONSTRUCTION: Primary | ICD-10-CM

## 2019-07-08 ENCOUNTER — OFFICE VISIT (OUTPATIENT)
Dept: ORTHOPEDICS | Facility: CLINIC | Age: 29
End: 2019-07-08
Payer: COMMERCIAL

## 2019-07-08 ENCOUNTER — TRANSFERRED RECORDS (OUTPATIENT)
Dept: HEALTH INFORMATION MANAGEMENT | Facility: CLINIC | Age: 29
End: 2019-07-08

## 2019-07-08 ENCOUNTER — ANCILLARY PROCEDURE (OUTPATIENT)
Dept: GENERAL RADIOLOGY | Facility: CLINIC | Age: 29
End: 2019-07-08
Attending: ORTHOPAEDIC SURGERY
Payer: COMMERCIAL

## 2019-07-08 DIAGNOSIS — Z98.890 S/P ACL RECONSTRUCTION: ICD-10-CM

## 2019-07-08 DIAGNOSIS — Z98.890 S/P ACL RECONSTRUCTION: Primary | ICD-10-CM

## 2019-07-08 NOTE — LETTER
7/8/2019       RE: Erika Christianson  [address]  [place] [address]     Dear Colleague,    Thank you for referring your patient, Erika Christianson, to the [facility]. Please see a copy of my visit note below.    DIAGNOSIS:   1. Right ACL tear    PROCEDURES:  1. ACL reconstruction bone tendon bone autograft, date of surgery 6/28/2019    HISTORY:  Doing well.  Off narcotics.  Off crutches, out of brace.  Working with therapy.    EXAM:     General: Awake, Alert, and oriented. Articulates and communicates with a normal affect     Right lower Extremity:    Incisions well healed without evidence of infection    Normal post-operative effusion and ecchymosis    Range of motion and stability exam not performed    Neurovascularly intact    IMAGING:  AP and lateral radiographs of tunnels and hardware in good position    ASSESSMENT:  1. 1 week following ACL reconstruction bone tendon bone autograft    PLAN:     Weightbearing as tolerated    Range of motion as tolerated    Sutures removed in clinic    Leave steri-strips in place until they fall off    OK to shower allowing water to run over incision    No soaking, scrubbing, baths, or lake for 1 additional week    Continue PT as scheduled     Pain medications reviewed and no refills required.     Operative report provided and arthroscopic images reviewed    Follow up at 6 weeks from the date of surgery with no new X-Rays needed           Again, thank you for allowing me to participate in the care of your patient.      Sincerely,    Arias Sanchez MD

## 2019-07-08 NOTE — PROGRESS NOTES
DIAGNOSIS:   1. Right ACL tear    PROCEDURES:  1. ACL reconstruction bone tendon bone autograft, date of surgery 6/28/2019    HISTORY:  Doing well.  Off narcotics.  Off crutches, out of brace.  Working with therapy.    EXAM:     General: Awake, Alert, and oriented. Articulates and communicates with a normal affect     Right lower Extremity:    Incisions well healed without evidence of infection    Normal post-operative effusion and ecchymosis    Range of motion and stability exam not performed    Neurovascularly intact    IMAGING:  AP and lateral radiographs of tunnels and hardware in good position    ASSESSMENT:  1. 1 week following ACL reconstruction bone tendon bone autograft    PLAN:     Weightbearing as tolerated    Range of motion as tolerated    Sutures removed in clinic    Leave steri-strips in place until they fall off    OK to shower allowing water to run over incision    No soaking, scrubbing, baths, or lake for 1 additional week    Continue PT as scheduled     Pain medications reviewed and no refills required.     Operative report provided and arthroscopic images reviewed    Follow up at 6 weeks from the date of surgery with no new X-Rays needed

## 2019-08-12 ENCOUNTER — OFFICE VISIT (OUTPATIENT)
Dept: ORTHOPEDICS | Facility: CLINIC | Age: 29
End: 2019-08-12
Payer: COMMERCIAL

## 2019-08-12 DIAGNOSIS — Z98.890 S/P ACL RECONSTRUCTION: Primary | ICD-10-CM

## 2019-08-12 NOTE — LETTER
Return to Work  2019     Seen today: yes    Patient:  Erika Christianson  :   1990  MRN:     [id number]  Physician: KANDI SANCHEZ        To whom it may concern;      Erika Christianson is under my care following her right knee surgery on 2019. She may return to work for 4 hour shifts. As she is able, she may progress up to 8 hour shifts.     Please contact my office with any questions or concerns.        Electronically signed by Kandi Sanchez MD & Erin Carbajal ATC

## 2019-08-12 NOTE — PROGRESS NOTES
DIAGNOSIS:   1. Right ACL tear    PROCEDURES:  1. ACL reconstruction bone tendon bone autograft, date of surgery 6/28/2019    HISTORY:  Doing well 6 weeks following the above surgery.  No pain.  Off narcotics.  Doing therapy.    EXAM:     General: Awake, Alert, and oriented. Articulates and communicates with a normal affect     Right lower Extremity:    Incisions well healed without evidence of infection    No post-operative effusion or ecchymosis    Range of motion and stability exam not performed    Neurovascularly intact    IMAGING:  No new images today  ASSESSMENT:  1. 6 weeks following ACL reconstruction bone tendon bone autograft    PLAN:   Weightbearing: WBAT  Range of Motion: No range of motion restrictions  Pain Medications: We reviewed post-operative pain medications at today's visit. The patient has stopped all opioid pain medications and no further refills are required  Extension: We reviewed the importance of full knee extension and demonstrated the relevant exercises as appropriate  Crutches/Brace: Patient no longer requires the hinged knee brace or crutches.   Acitivity Restrictions:  Discussed that this is the dangerous time after ACL reconstruction  Reviewed activity restrictions at today's visit  Goal to progress strength and motion to allow straight line running at three months from the date of surgery      Follow up: 6 weeks with no new radiographs needed

## 2019-08-12 NOTE — LETTER
8/12/2019       RE: Erika Christianson  [address] [place] [address] [address]  [place] [address]     Dear Colleague,    Thank you for referring your patient, Erika Christianson, to the [facility] at [facility]. Please see a copy of my visit note below.    DIAGNOSIS:   1. Right ACL tear    PROCEDURES:  1. ACL reconstruction bone tendon bone autograft, date of surgery 6/28/2019    HISTORY:  Doing well 6 weeks following the above surgery.  No pain.  Off narcotics.  Doing therapy.    EXAM:     General: Awake, Alert, and oriented. Articulates and communicates with a normal affect     Right lower Extremity:    Incisions well healed without evidence of infection    No post-operative effusion or ecchymosis    Range of motion and stability exam not performed    Neurovascularly intact    IMAGING:  No new images today  ASSESSMENT:  1. 6 weeks following ACL reconstruction bone tendon bone autograft    PLAN:   Weightbearing: WBAT  Range of Motion: No range of motion restrictions  Pain Medications: We reviewed post-operative pain medications at today's visit. The patient has stopped all opioid pain medications and no further refills are required  Extension: We reviewed the importance of full knee extension and demonstrated the relevant exercises as appropriate  Crutches/Brace: Patient no longer requires the hinged knee brace or crutches.   Acitivity Restrictions:  Discussed that this is the dangerous time after ACL reconstruction  Reviewed activity restrictions at today's visit  Goal to progress strength and motion to allow straight line running at three months from the date of surgery      Follow up: 6 weeks with no new radiographs needed            Again, thank you for allowing me to participate in the care of your patient.      Sincerely,    Arias Sanchez MD

## 2019-09-23 ENCOUNTER — OFFICE VISIT (OUTPATIENT)
Dept: ORTHOPEDICS | Facility: CLINIC | Age: 29
End: 2019-09-23
Payer: COMMERCIAL

## 2019-09-23 DIAGNOSIS — Z98.890 S/P ACL RECONSTRUCTION: Primary | ICD-10-CM

## 2019-09-23 NOTE — PROGRESS NOTES
DIAGNOSIS:   1. Right ACL tear    PROCEDURES:  1. ACL reconstruction bone tendon bone autograft, date of surgery 6/28/2019    HISTORY:  Doing well 12 weeks following the above procedure.  No pain.  Off narcotics.  Doing well.    EXAM:     General: Awake, Alert, and oriented. Articulates and communicates with a normal affect     Right lower Extremity:    Incisions well healed without evidence of infection    No post-operative effusion or ecchymosis    Range of motion and stability exam not performed    Neurovascularly intact    IMAGING:  No new images today    ASSESSMENT:  1. 12 weeks following ACL reconstruction bone tendon bone autograft    PLAN:     Weightbearing: WBAT    Range of Motion: No range of motion restrictions.     Acitivity Restrictions:    May do straight line running at this time    No running distance or pace restrictions    No cutting, pivoting, or start-stop running    Goal to build strength, endurance, and confidence to allow sports in 3 months time (6 months from the date of surgery)    Brace: Discussed knee bracing options for sports including neoprene knee sleeve ACL functional bracing.     Discussed post-ACL reconstruction therapy program    Follow up: 3 months no XRays with an ACL functional test as completed by physical therapy.

## 2019-09-23 NOTE — LETTER
9/23/2019       RE: Erika Christianson  [address] [place] [address] [address]  [place] [address]     Dear Colleague,    Thank you for referring your patient, Erika Christianson, to the [facility] at [facility]. Please see a copy of my visit note below.    DIAGNOSIS:   1. Right ACL tear    PROCEDURES:  1. ACL reconstruction bone tendon bone autograft, date of surgery 6/28/2019    HISTORY:  Doing well 12 weeks following the above procedure.  No pain.  Off narcotics.  Doing well.    EXAM:     General: Awake, Alert, and oriented. Articulates and communicates with a normal affect     Right lower Extremity:    Incisions well healed without evidence of infection    No post-operative effusion or ecchymosis    Range of motion and stability exam not performed    Neurovascularly intact    IMAGING:  No new images today    ASSESSMENT:  1. 12 weeks following ACL reconstruction bone tendon bone autograft    PLAN:     Weightbearing: WBAT    Range of Motion: No range of motion restrictions.     Acitivity Restrictions:    May do straight line running at this time    No running distance or pace restrictions    No cutting, pivoting, or start-stop running    Goal to build strength, endurance, and confidence to allow sports in 3 months time (6 months from the date of surgery)    Brace: Discussed knee bracing options for sports including neoprene knee sleeve ACL functional bracing.     Discussed post-ACL reconstruction therapy program    Follow up: 3 months no XRays with an ACL functional test as completed by physical therapy.            Again, thank you for allowing me to participate in the care of your patient.      Sincerely,    Arias Sanchez MD

## 2019-09-23 NOTE — NURSING NOTE
Reason For Visit:   Chief Complaint   Patient presents with     Surgical Followup     DOS 6/28/19 Right Examinaton Under Anesthesia. Anterior Cruciate Ligament Resconstruction Bone Tendon Bone Autograft       Date of surgery: 9/23/19  Type of surgery:   PROCEDURE:  1. Examination under anesthesia right knee  2. Right knee arthroscopy  3. ACL reconstruction bone tendon bone autograft    Smoker: No  Request smoking cessation information: No    Pain Assessment  Patient Currently in Pain: No    There were no vitals taken for this visit.         Allergies   Allergen Reactions     Doxycycline Hives     Malarone      Neuropsychiatric sx     Sulfa Drugs      Amoxicillin-Pot Clavulanate Anxiety and Other (See Comments)     Swollen, sore jaw        Current Outpatient Medications   Medication     acetaminophen (TYLENOL) 325 MG tablet     fish oil-omega-3 fatty acids (FISH OIL) 1000 MG capsule     fludrocortisone (FLORINEF) 0.1 MG tablet     l-arginine 1000 MG TABS     Multiple Vitamins-Minerals (MULTIVITAL) TABS     VITAMIN D, CHOLECALCIFEROL, PO     ondansetron (ZOFRAN-ODT) 4 MG ODT tab     oxyCODONE (ROXICODONE) 5 MG tablet     senna-docusate (SENOKOT-S/PERICOLACE) 8.6-50 MG tablet     No current facility-administered medications for this visit.          Erin Carbajal, ATC

## 2020-01-13 ENCOUNTER — OFFICE VISIT (OUTPATIENT)
Dept: ORTHOPEDICS | Facility: CLINIC | Age: 30
End: 2020-01-13
Payer: COMMERCIAL

## 2020-01-13 VITALS — HEIGHT: 66 IN | RESPIRATION RATE: 16 BRPM | WEIGHT: 122 LBS | BODY MASS INDEX: 19.61 KG/M2

## 2020-01-13 DIAGNOSIS — Z98.890 S/P ACL RECONSTRUCTION: Primary | ICD-10-CM

## 2020-01-13 ASSESSMENT — MIFFLIN-ST. JEOR: SCORE: 1304.82

## 2020-01-13 NOTE — LETTER
1/13/2020       RE: Erika Christianson  [address] [place] [address] [address]  [place] [address]     Dear Colleague,    Thank you for referring your patient, Erika Christianson, to the [facility] at [facility]. Please see a copy of my visit note below.    DIAGNOSIS:   1. Right ACL tear    PROCEDURES:  1. ACL reconstruction bone tendon bone autograft, date of surgery 6/28/2019    HISTORY:  Doing well 6 months following the above procedure.  No pain.  Back to work.  Graduated from therapy.  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    EXAM:     General: Awake, Alert, and oriented. Articulates and communicates with a normal affect     Right lower Extremity:    Incisions well healed without evidence of infection    No post-operative effusion or ecchymosis    Range of motion and stability exam not performed    Neurovascularly intact    IMAGING:  No new images today    ASSESSMENT:  1. 6 Months following ACL reconstruction bone tendon bone autograft    PLAN:     Weightbearing: No weight bearing restriction    Range of Motion: No range of motion restrictions.     Acitivity Restrictions:    Begin to return to sports in a structured manner     Goal to return to game competition between 7-10 months    Follow up: PRN        Again, thank you for allowing me to participate in the care of your patient.      Sincerely,    Arias Sanchez MD

## 2020-01-13 NOTE — NURSING NOTE
Reason For Visit:   Chief Complaint   Patient presents with     Surgical Followup     DOS 6/28/19 Right Examinaton Under Anesthesia. Anterior Cruciate Ligament Resconstruction Bone Tendon Bone Autograft         Date of surgery: 6/28/19  Type of surgery: Right Examinaton Under Anesthesia. Anterior Cruciate Ligament Resconstruction Bone Tendon Bone Autograft.

## 2020-03-02 ENCOUNTER — HEALTH MAINTENANCE LETTER (OUTPATIENT)
Age: 30
End: 2020-03-02

## 2020-10-22 NOTE — PROGRESS NOTES
DIAGNOSIS:   1. Right ACL tear    PROCEDURES:  1. ACL reconstruction bone tendon bone autograft, date of surgery 6/28/2019    HISTORY:  Doing well 6 months following the above procedure.  No pain.  Back to work.  Graduated from therapy.  Feels like she is ready to do ultimate [employer] this summer..    EXAM:     General: Awake, Alert, and oriented. Articulates and communicates with a normal affect     Right lower Extremity:    Incisions well healed without evidence of infection    No post-operative effusion or ecchymosis    Range of motion and stability exam not performed    Neurovascularly intact    IMAGING:  No new images today    ASSESSMENT:  1. 6 Months following ACL reconstruction bone tendon bone autograft    PLAN:     Weightbearing: No weight bearing restriction    Range of Motion: No range of motion restrictions.     Acitivity Restrictions:    Begin to return to sports in a structured manner     Goal to return to game competition between 7-10 months    Follow up: PRN       Noted- will keep this message in in basket to reference fax number. Stephanie RN

## 2020-12-14 ENCOUNTER — HEALTH MAINTENANCE LETTER (OUTPATIENT)
Age: 30
End: 2020-12-14

## 2021-04-18 ENCOUNTER — HEALTH MAINTENANCE LETTER (OUTPATIENT)
Age: 31
End: 2021-04-18

## 2021-10-02 ENCOUNTER — HEALTH MAINTENANCE LETTER (OUTPATIENT)
Age: 31
End: 2021-10-02

## 2022-05-14 ENCOUNTER — HEALTH MAINTENANCE LETTER (OUTPATIENT)
Age: 32
End: 2022-05-14

## 2022-09-03 ENCOUNTER — HEALTH MAINTENANCE LETTER (OUTPATIENT)
Age: 32
End: 2022-09-03

## 2022-12-09 ENCOUNTER — HOSPITAL ENCOUNTER (EMERGENCY)
Dept: HOSPITAL 94 - ER | Age: 32
Discharge: HOME | End: 2022-12-09
Payer: COMMERCIAL

## 2022-12-09 VITALS — DIASTOLIC BLOOD PRESSURE: 70 MMHG | SYSTOLIC BLOOD PRESSURE: 11 MMHG

## 2022-12-09 VITALS — WEIGHT: 130.07 LBS | HEIGHT: 67 IN | BODY MASS INDEX: 20.42 KG/M2

## 2022-12-09 DIAGNOSIS — Z79.899: ICD-10-CM

## 2022-12-09 DIAGNOSIS — M54.59: Primary | ICD-10-CM

## 2022-12-09 PROCEDURE — 99282 EMERGENCY DEPT VISIT SF MDM: CPT

## 2023-06-03 ENCOUNTER — HEALTH MAINTENANCE LETTER (OUTPATIENT)
Age: 33
End: 2023-06-03

## (undated) DEVICE — PAD ARMBOARD FOAM EGGCRATE COVIDEN 3114367

## (undated) DEVICE — ABLATOR ARTHREX APOLLO RF MP90 ASPIRATING 90DEG AR-9811

## (undated) DEVICE — ESU PENCIL SMOKE EVAC W/ROCKER SWITCH 0703-047-000

## (undated) DEVICE — DRSG STERI STRIP 1/2X4" R1547

## (undated) DEVICE — LINEN GOWN XLG 5407

## (undated) DEVICE — GLOVE PROTEXIS BLUE W/NEU-THERA 8.0  2D73EB80

## (undated) DEVICE — SU LOOP #2 TIGERLOOP AR-7234T

## (undated) DEVICE — SU VICRYL 2-0 CT-1 27" UND J259H

## (undated) DEVICE — PREP DURAPREP REMOVER 4OZ 8611

## (undated) DEVICE — SOL NACL 0.9% IRRIG 3000ML BAG 2B7477

## (undated) DEVICE — ESU GROUND PAD ADULT W/CORD E7507

## (undated) DEVICE — REAMER ARTHREX LOW PROFILE 10MM  AR-1410LP

## (undated) DEVICE — SU ETHIBOND 1 CT-1 30" X425H

## (undated) DEVICE — PACK ACL SUPPLEMENT CUSTOM ASC

## (undated) DEVICE — PEN MARKING SKIN W/PAPER RULER 31145785

## (undated) DEVICE — PIN GUIDE ARTHREX 2.4MM W/EYE BEATH PIN AR-1297L

## (undated) DEVICE — SU FIBERWIRE 2 38"  AR-7200

## (undated) DEVICE — PIN GUIDE ARTHREX 2.4MM DRILL  AR-1250L

## (undated) DEVICE — TUBING SYSTEM ARTHREX PATIENT REDEUCE AR-6421

## (undated) DEVICE — LINEN ORTHO PACK 5446

## (undated) DEVICE — SU MONOCRYL 3-0 PS-1 27" Y936H

## (undated) DEVICE — PACK ARTHROSCOPY CUSTOM ASC

## (undated) DEVICE — Device

## (undated) DEVICE — BLADE SAW OSCILLATING STRK MICRO 9X10X0.51MM 2296-033-220

## (undated) DEVICE — SUCTION MANIFOLD NEPTUNE 2 SYS 4 PORT 0702-020-000

## (undated) DEVICE — GLOVE PROTEXIS POWDER FREE SMT 8.0  2D72PT80X

## (undated) DEVICE — PREP DURAPREP 26ML APL 8630

## (undated) DEVICE — BUR ARTHREX COOLCUT SABRE 4.0MMX13CM AR-8400SR

## (undated) RX ORDER — PROPOFOL 10 MG/ML
INJECTION, EMULSION INTRAVENOUS
Status: DISPENSED
Start: 2019-06-28

## (undated) RX ORDER — LIDOCAINE HYDROCHLORIDE 20 MG/ML
INJECTION, SOLUTION EPIDURAL; INFILTRATION; INTRACAUDAL; PERINEURAL
Status: DISPENSED
Start: 2019-06-28

## (undated) RX ORDER — KETOROLAC TROMETHAMINE 30 MG/ML
INJECTION, SOLUTION INTRAMUSCULAR; INTRAVENOUS
Status: DISPENSED
Start: 2019-06-28

## (undated) RX ORDER — GABAPENTIN 300 MG/1
CAPSULE ORAL
Status: DISPENSED
Start: 2019-06-28

## (undated) RX ORDER — FENTANYL CITRATE 50 UG/ML
INJECTION, SOLUTION INTRAMUSCULAR; INTRAVENOUS
Status: DISPENSED
Start: 2019-06-28

## (undated) RX ORDER — OXYCODONE HYDROCHLORIDE 5 MG/1
TABLET ORAL
Status: DISPENSED
Start: 2019-06-28

## (undated) RX ORDER — ONDANSETRON 2 MG/ML
INJECTION INTRAMUSCULAR; INTRAVENOUS
Status: DISPENSED
Start: 2019-06-28

## (undated) RX ORDER — CLINDAMYCIN PHOSPHATE 900 MG/50ML
INJECTION, SOLUTION INTRAVENOUS
Status: DISPENSED
Start: 2019-06-28

## (undated) RX ORDER — ACETAMINOPHEN 325 MG/1
TABLET ORAL
Status: DISPENSED
Start: 2019-06-28

## (undated) RX ORDER — DEXAMETHASONE SODIUM PHOSPHATE 4 MG/ML
INJECTION, SOLUTION INTRA-ARTICULAR; INTRALESIONAL; INTRAMUSCULAR; INTRAVENOUS; SOFT TISSUE
Status: DISPENSED
Start: 2019-06-28